# Patient Record
Sex: FEMALE | HISPANIC OR LATINO | Employment: FULL TIME | URBAN - METROPOLITAN AREA
[De-identification: names, ages, dates, MRNs, and addresses within clinical notes are randomized per-mention and may not be internally consistent; named-entity substitution may affect disease eponyms.]

---

## 2023-07-06 ENCOUNTER — APPOINTMENT (OUTPATIENT)
Dept: GENERAL RADIOLOGY | Facility: CLINIC | Age: 33
End: 2023-07-06
Attending: EMERGENCY MEDICINE
Payer: OTHER MISCELLANEOUS

## 2023-07-06 ENCOUNTER — HOSPITAL ENCOUNTER (EMERGENCY)
Facility: CLINIC | Age: 33
Discharge: HOME | End: 2023-07-06
Attending: EMERGENCY MEDICINE | Admitting: EMERGENCY MEDICINE
Payer: OTHER MISCELLANEOUS

## 2023-07-06 VITALS
HEART RATE: 89 BPM | TEMPERATURE: 98.3 F | RESPIRATION RATE: 16 BRPM | DIASTOLIC BLOOD PRESSURE: 63 MMHG | SYSTOLIC BLOOD PRESSURE: 112 MMHG | OXYGEN SATURATION: 99 %

## 2023-07-06 DIAGNOSIS — S69.91XA HAND INJURY, RIGHT, INITIAL ENCOUNTER: ICD-10-CM

## 2023-07-06 PROCEDURE — 99283 EMERGENCY DEPT VISIT LOW MDM: CPT | Performed by: EMERGENCY MEDICINE

## 2023-07-06 PROCEDURE — 73130 X-RAY EXAM OF HAND: CPT | Mod: RT

## 2023-07-06 PROCEDURE — 250N000013 HC RX MED GY IP 250 OP 250 PS 637: Performed by: EMERGENCY MEDICINE

## 2023-07-06 RX ORDER — ACETAMINOPHEN 500 MG
1000 TABLET ORAL ONCE
Status: COMPLETED | OUTPATIENT
Start: 2023-07-06 | End: 2023-07-06

## 2023-07-06 RX ADMIN — ACETAMINOPHEN 1000 MG: 500 TABLET ORAL at 10:15

## 2023-07-06 ASSESSMENT — ACTIVITIES OF DAILY LIVING (ADL): ADLS_ACUITY_SCORE: 35

## 2023-07-06 NOTE — ED TRIAGE NOTES
Triage Assessment     Row Name 07/06/23 0803       Triage Assessment (Adult)    Airway WDL WDL       Respiratory WDL    Respiratory WDL WDL       Skin Circulation/Temperature WDL    Skin Circulation/Temperature WDL WDL       Cardiac WDL    Cardiac WDL WDL       Peripheral/Neurovascular WDL    Peripheral Neurovascular WDL WDL       Cognitive/Neuro/Behavioral WDL    Cognitive/Neuro/Behavioral WDL WDL

## 2023-07-06 NOTE — ED PROVIDER NOTES
X-ray negative for fracture.  Ligaments appear to be intact based on physical exam as patient has full flexion extension of all the affected ED Provider Note  [facility]      History     Chief Complaint   Patient presents with     Hand Pain     Rt hand pinched by metal detector this AM at work     HPI  Siri He is a 32 year old female who presents with injury to right hand in a work-related incident.  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  She reports that she had her hand and bearing turned and pinched her second third and fourth digit.  No new injuries.  Patient is left-hand dominant.  Patient reports tetanus was updated a year ago.    Past Medical History  No past medical history on file.  No past surgical history on file.  No current outpatient medications on file.    No Known Allergies  Family History  No family history on file.  Social History          A medically appropriate review of systems was performed with pertinent positives and negatives noted in the HPI, and all other systems negative.    Physical Exam   BP: 112/63  Pulse: 89  Temp: 98.3  F (36.8  C)  Resp: 16  SpO2: 99 %  Physical Exam  General: Awake alert no acute distress  Extremities: On patient's right hand she has a very small superficial laceration noted over the first DIP that does not extend into the joint.  She also has a subungual hematoma that occupies about 25% of the nail on the fourth digit.  She has normal flexion extension at the DIP joint of all affected joints including when holding the PIP in extension.    ED Course, Procedures, & Data      Procedures    Results for orders placed or performed during the hospital encounter of 07/06/23   XR Hand Right G/E 3 Views     Status: None    Narrative    XR HAND RIGHT G/E 3 VIEWS   7/6/2023 8:30 AM     HISTORY: Trauma, hand pain.  COMPARISON: None.       Impression    IMPRESSION: Normal joint spaces and  alignment. No fracture.    BERONICA MACK MD         SYSTEM ID:  [id number]     Medications - No data to display  Labs Ordered and Resulted from Time of ED Arrival to Time of ED Departure - No data to display  XR Hand Right G/E 3 Views   Final Result   IMPRESSION: Normal joint spaces and alignment. No fracture.      BERONICA MACK MD            SYSTEM ID:  [id number]             Critical care was not performed.     Medical Decision Making  The patient's presentation was of straightforward complexity (a clearly self-limited or minor problem).    The patient's evaluation involved:  ordering and/or review of 1 test(s) in this encounter (see separate area of note for details)  independent interpretation of testing performed by another health professional (X-ray reviewed by me, no fracture or dislocation)    The patient's management necessitated only low risk treatment.      Assessment & Plan    X-ray negative for acute fracture; normal joint spaces.  Patient has a very small skin break but does not require repair.  Tetanus is up-to-date.  Patient's ligaments are intact with normal flexion extension of the affected digits.    Instructed on pain control.  Patient was discharged.    I have reviewed the nursing notes. I have reviewed the findings, diagnosis, plan and need for follow up with the patient.    New Prescriptions    No medications on file       Final diagnoses:   Hand injury, right, initial encounter       Jun Lozada  [facility] EMERGENCY DEPARTMENT  7/6/2023     Jun Lozada MD  07/06/23 [id number]

## 2023-07-06 NOTE — ED TRIAGE NOTES
Rt hand pinched by metal detector this AM at work     Triage Assessment       Row Name 07/06/23 0803       Triage Assessment (Adult)    Airway WDL WDL       Respiratory WDL    Respiratory WDL WDL       Skin Circulation/Temperature WDL    Skin Circulation/Temperature WDL WDL       Cardiac WDL    Cardiac WDL WDL       Peripheral/Neurovascular WDL    Peripheral Neurovascular WDL WDL       Cognitive/Neuro/Behavioral WDL    Cognitive/Neuro/Behavioral WDL WDL

## 2023-07-06 NOTE — DISCHARGE INSTRUCTIONS
Take 1000 mg of Tylenol and 600 mg of ibuprofen every 6 hours for pain control.    Your x-ray showed no fracture.